# Patient Record
Sex: FEMALE | Race: WHITE | NOT HISPANIC OR LATINO | URBAN - NONMETROPOLITAN AREA
[De-identification: names, ages, dates, MRNs, and addresses within clinical notes are randomized per-mention and may not be internally consistent; named-entity substitution may affect disease eponyms.]

---

## 2018-05-31 PROBLEM — H52.13: Noted: 2018-05-31

## 2019-01-17 ENCOUNTER — IMPORTED ENCOUNTER (OUTPATIENT)
Dept: URBAN - NONMETROPOLITAN AREA CLINIC 1 | Facility: CLINIC | Age: 24
End: 2019-01-17

## 2019-01-17 PROCEDURE — 92014 COMPRE OPH EXAM EST PT 1/>: CPT

## 2019-01-17 PROCEDURE — 92015 DETERMINE REFRACTIVE STATE: CPT

## 2019-01-17 NOTE — PATIENT DISCUSSION
Myopia OU- Discussed diagnosis in detail with patient - New glasses RX given today- Continue to monitor; 's Notes: MR  1/17/19DFE  1/17/19

## 2021-06-21 ENCOUNTER — IMPORTED ENCOUNTER (OUTPATIENT)
Dept: URBAN - NONMETROPOLITAN AREA CLINIC 1 | Facility: CLINIC | Age: 26
End: 2021-06-21

## 2021-06-21 PROCEDURE — 92015 DETERMINE REFRACTIVE STATE: CPT

## 2021-06-21 PROCEDURE — 92014 COMPRE OPH EXAM EST PT 1/>: CPT

## 2021-06-21 NOTE — PATIENT DISCUSSION
Myopia OU- Discussed diagnosis in detail with patient - New glasses RX given today MR re-checked today by Dr. Gerry Alcazar - Explained to patient that she has had a big shift in RX- Continue to monitor - RTC 1 year complete; 's Notes: MR  1/17/19DFE  1/17/19

## 2022-04-09 ASSESSMENT — VISUAL ACUITY
OS_SC: 20/100-
OD_PH: 20/25
OS_PH: 20/25
OD_SC: 20/40+
OD_PH: 20/40-
OS_SC: 20/40+2
OD_SC: 20/60-
OD_CC: J1
OS_CC: J1+
OS_PH: 20/25-2

## 2022-04-09 ASSESSMENT — TONOMETRY
OD_IOP_MMHG: 15
OD_IOP_MMHG: 16
OS_IOP_MMHG: 18
OS_IOP_MMHG: 14

## 2023-10-05 ENCOUNTER — ESTABLISHED PATIENT (OUTPATIENT)
Dept: URBAN - NONMETROPOLITAN AREA CLINIC 1 | Facility: CLINIC | Age: 28
End: 2023-10-05

## 2023-10-05 DIAGNOSIS — H52.13: ICD-10-CM

## 2023-10-05 PROCEDURE — 92014 COMPRE OPH EXAM EST PT 1/>: CPT

## 2023-10-05 PROCEDURE — 92015 DETERMINE REFRACTIVE STATE: CPT

## 2023-10-05 ASSESSMENT — KERATOMETRY
OD_AXISANGLE2_DEGREES: 58
OS_AXISANGLE_DEGREES: 17
OD_AXISANGLE_DEGREES: 148
OS_K1POWER_DIOPTERS: 45.25
OS_AXISANGLE2_DEGREES: 107
OS_K2POWER_DIOPTERS: 46.25
OD_K2POWER_DIOPTERS: 45.75
OD_K1POWER_DIOPTERS: 45.00

## 2023-10-05 ASSESSMENT — VISUAL ACUITY
OU_CC: 20/22-1
OD_CC: 20/29
OS_CC: 20/32-

## 2023-10-05 ASSESSMENT — TONOMETRY
OS_IOP_MMHG: 18
OD_IOP_MMHG: 18

## 2023-10-27 ENCOUNTER — CONTACT LENSES/GLASSES VISIT (OUTPATIENT)
Dept: URBAN - NONMETROPOLITAN AREA CLINIC 1 | Facility: CLINIC | Age: 28
End: 2023-10-27

## 2023-10-27 DIAGNOSIS — H52.13: ICD-10-CM

## 2023-10-27 PROCEDURE — 92310-N CONTACT LENS FITTING NEW PATIENT

## 2023-10-27 ASSESSMENT — KERATOMETRY
OD_K2POWER_DIOPTERS: 45.75
OS_K2POWER_DIOPTERS: 46.25
OD_AXISANGLE2_DEGREES: 58
OS_K1POWER_DIOPTERS: 45.25
OD_K1POWER_DIOPTERS: 45.00
OS_AXISANGLE_DEGREES: 17
OD_AXISANGLE_DEGREES: 148
OS_AXISANGLE2_DEGREES: 107